# Patient Record
Sex: FEMALE | Race: WHITE | NOT HISPANIC OR LATINO | URBAN - METROPOLITAN AREA
[De-identification: names, ages, dates, MRNs, and addresses within clinical notes are randomized per-mention and may not be internally consistent; named-entity substitution may affect disease eponyms.]

---

## 2018-12-01 ENCOUNTER — INPATIENT (INPATIENT)
Facility: HOSPITAL | Age: 28
LOS: 3 days | Discharge: PSYCHIATRIC OR SUBSTANCE USE FACILITY | DRG: 389 | End: 2018-12-05
Attending: SURGERY | Admitting: SURGERY
Payer: COMMERCIAL

## 2018-12-01 VITALS
SYSTOLIC BLOOD PRESSURE: 121 MMHG | DIASTOLIC BLOOD PRESSURE: 64 MMHG | HEART RATE: 109 BPM | TEMPERATURE: 98 F | OXYGEN SATURATION: 99 % | RESPIRATION RATE: 18 BRPM

## 2018-12-01 LAB
ALBUMIN SERPL ELPH-MCNC: 2.6 G/DL — LOW (ref 3.4–5)
ALP SERPL-CCNC: 97 U/L — SIGNIFICANT CHANGE UP (ref 40–120)
ALT FLD-CCNC: SIGNIFICANT CHANGE UP U/L (ref 12–42)
ANION GAP SERPL CALC-SCNC: 10 MMOL/L — SIGNIFICANT CHANGE UP (ref 9–16)
APAP SERPL-MCNC: 3.4 UG/ML — LOW (ref 10–30)
APPEARANCE UR: CLEAR — SIGNIFICANT CHANGE UP
APTT BLD: 32.6 SEC — SIGNIFICANT CHANGE UP (ref 27.5–36.3)
AST SERPL-CCNC: 71 U/L — HIGH (ref 15–37)
BASOPHILS NFR BLD AUTO: 0.2 % — SIGNIFICANT CHANGE UP (ref 0–2)
BILIRUB SERPL-MCNC: SIGNIFICANT CHANGE UP MG/DL (ref 0.2–1.2)
BILIRUB UR-MCNC: NEGATIVE — SIGNIFICANT CHANGE UP
BUN SERPL-MCNC: 24 MG/DL — HIGH (ref 7–23)
CALCIUM SERPL-MCNC: 9.4 MG/DL — SIGNIFICANT CHANGE UP (ref 8.5–10.5)
CHLORIDE SERPL-SCNC: 105 MMOL/L — SIGNIFICANT CHANGE UP (ref 96–108)
CK SERPL-CCNC: 2494 U/L — HIGH (ref 26–192)
CO2 SERPL-SCNC: 18 MMOL/L — LOW (ref 22–31)
COLOR SPEC: YELLOW — SIGNIFICANT CHANGE UP
CREAT SERPL-MCNC: 1.03 MG/DL — SIGNIFICANT CHANGE UP (ref 0.5–1.3)
DIFF PNL FLD: ABNORMAL
EOSINOPHIL NFR BLD AUTO: 0.5 % — SIGNIFICANT CHANGE UP (ref 0–6)
GLUCOSE SERPL-MCNC: 109 MG/DL — HIGH (ref 70–99)
GLUCOSE UR QL: NEGATIVE — SIGNIFICANT CHANGE UP
HCG SERPL-ACNC: 1 MIU/ML — SIGNIFICANT CHANGE UP
HCT VFR BLD CALC: 37.3 % — SIGNIFICANT CHANGE UP (ref 34.5–45)
HGB BLD-MCNC: 12.6 G/DL — SIGNIFICANT CHANGE UP (ref 11.5–15.5)
IMM GRANULOCYTES NFR BLD AUTO: 0.5 % — SIGNIFICANT CHANGE UP (ref 0–1.5)
INR BLD: 1.18 — HIGH (ref 0.88–1.16)
KETONES UR-MCNC: NEGATIVE — SIGNIFICANT CHANGE UP
LACTATE SERPL-SCNC: 1.3 MMOL/L — SIGNIFICANT CHANGE UP (ref 0.4–2)
LEUKOCYTE ESTERASE UR-ACNC: NEGATIVE — SIGNIFICANT CHANGE UP
LYMPHOCYTES # BLD AUTO: 6.1 % — LOW (ref 13–44)
MAGNESIUM SERPL-MCNC: 2.5 MG/DL — SIGNIFICANT CHANGE UP (ref 1.6–2.6)
MCHC RBC-ENTMCNC: 26.8 PG — LOW (ref 27–34)
MCHC RBC-ENTMCNC: 33.8 G/DL — SIGNIFICANT CHANGE UP (ref 32–36)
MCV RBC AUTO: 79.4 FL — LOW (ref 80–100)
MONOCYTES NFR BLD AUTO: 6.4 % — SIGNIFICANT CHANGE UP (ref 2–14)
NEUTROPHILS NFR BLD AUTO: 86.3 % — HIGH (ref 43–77)
NITRITE UR-MCNC: NEGATIVE — SIGNIFICANT CHANGE UP
PCP SPEC-MCNC: SIGNIFICANT CHANGE UP
PH UR: 6 — SIGNIFICANT CHANGE UP (ref 5–8)
PLATELET # BLD AUTO: 321 K/UL — SIGNIFICANT CHANGE UP (ref 150–400)
POTASSIUM SERPL-MCNC: 4.4 MMOL/L — SIGNIFICANT CHANGE UP (ref 3.5–5.3)
POTASSIUM SERPL-SCNC: 4.4 MMOL/L — SIGNIFICANT CHANGE UP (ref 3.5–5.3)
PROT SERPL-MCNC: 7.9 G/DL — SIGNIFICANT CHANGE UP (ref 6.4–8.2)
PROT UR-MCNC: NEGATIVE MG/DL — SIGNIFICANT CHANGE UP
PROTHROM AB SERPL-ACNC: 13.1 SEC — HIGH (ref 10–12.9)
RBC # BLD: 4.7 M/UL — SIGNIFICANT CHANGE UP (ref 3.8–5.2)
RBC # FLD: 14.1 % — SIGNIFICANT CHANGE UP (ref 10.3–14.5)
SALICYLATES SERPL-MCNC: 2.3 MG/DL — LOW (ref 2.8–20)
SODIUM SERPL-SCNC: 133 MMOL/L — SIGNIFICANT CHANGE UP (ref 132–145)
SP GR SPEC: 1.01 — SIGNIFICANT CHANGE UP (ref 1–1.03)
UROBILINOGEN FLD QL: 0.2 E.U./DL — SIGNIFICANT CHANGE UP
WBC # BLD: 19.4 K/UL — HIGH (ref 3.8–10.5)
WBC # FLD AUTO: 19.4 K/UL — HIGH (ref 3.8–10.5)

## 2018-12-01 PROCEDURE — 72192 CT PELVIS W/O DYE: CPT | Mod: 26,59

## 2018-12-01 PROCEDURE — 72125 CT NECK SPINE W/O DYE: CPT | Mod: 26

## 2018-12-01 PROCEDURE — 71250 CT THORAX DX C-: CPT | Mod: 26

## 2018-12-01 PROCEDURE — 99292 CRITICAL CARE ADDL 30 MIN: CPT

## 2018-12-01 PROCEDURE — 99291 CRITICAL CARE FIRST HOUR: CPT

## 2018-12-01 PROCEDURE — 72131 CT LUMBAR SPINE W/O DYE: CPT | Mod: 26

## 2018-12-01 PROCEDURE — 71045 X-RAY EXAM CHEST 1 VIEW: CPT | Mod: 26

## 2018-12-01 PROCEDURE — 74176 CT ABD & PELVIS W/O CONTRAST: CPT | Mod: 26

## 2018-12-01 PROCEDURE — 93010 ELECTROCARDIOGRAM REPORT: CPT

## 2018-12-01 PROCEDURE — 70450 CT HEAD/BRAIN W/O DYE: CPT | Mod: 26

## 2018-12-01 RX ORDER — CEFTRIAXONE 500 MG/1
1 INJECTION, POWDER, FOR SOLUTION INTRAMUSCULAR; INTRAVENOUS ONCE
Qty: 0 | Refills: 0 | Status: COMPLETED | OUTPATIENT
Start: 2018-12-01 | End: 2018-12-01

## 2018-12-01 RX ORDER — ONDANSETRON 8 MG/1
4 TABLET, FILM COATED ORAL EVERY 6 HOURS
Qty: 0 | Refills: 0 | Status: DISCONTINUED | OUTPATIENT
Start: 2018-12-01 | End: 2018-12-01

## 2018-12-01 RX ORDER — MORPHINE SULFATE 50 MG/1
4 CAPSULE, EXTENDED RELEASE ORAL ONCE
Qty: 0 | Refills: 0 | Status: DISCONTINUED | OUTPATIENT
Start: 2018-12-01 | End: 2018-12-01

## 2018-12-01 RX ORDER — SODIUM CHLORIDE 9 MG/ML
2000 INJECTION, SOLUTION INTRAVENOUS
Qty: 0 | Refills: 0 | Status: COMPLETED | OUTPATIENT
Start: 2018-12-01 | End: 2018-12-01

## 2018-12-01 RX ORDER — SODIUM CHLORIDE 9 MG/ML
1000 INJECTION, SOLUTION INTRAVENOUS
Qty: 0 | Refills: 0 | Status: DISCONTINUED | OUTPATIENT
Start: 2018-12-01 | End: 2018-12-02

## 2018-12-01 RX ORDER — ACETAMINOPHEN 500 MG
1000 TABLET ORAL ONCE
Qty: 0 | Refills: 0 | Status: COMPLETED | OUTPATIENT
Start: 2018-12-01 | End: 2018-12-01

## 2018-12-01 RX ORDER — SODIUM CHLORIDE 9 MG/ML
1000 INJECTION, SOLUTION INTRAVENOUS
Qty: 0 | Refills: 0 | Status: COMPLETED | OUTPATIENT
Start: 2018-12-01 | End: 2018-12-01

## 2018-12-01 RX ORDER — SERTRALINE 25 MG/1
200 TABLET, FILM COATED ORAL ONCE
Qty: 0 | Refills: 0 | Status: DISCONTINUED | OUTPATIENT
Start: 2018-12-01 | End: 2018-12-01

## 2018-12-01 RX ORDER — LIDOCAINE 4 G/100G
10 CREAM TOPICAL ONCE
Qty: 0 | Refills: 0 | Status: COMPLETED | OUTPATIENT
Start: 2018-12-01 | End: 2018-12-01

## 2018-12-01 RX ORDER — ACETAMINOPHEN 500 MG
975 TABLET ORAL ONCE
Qty: 0 | Refills: 0 | Status: COMPLETED | OUTPATIENT
Start: 2018-12-01 | End: 2018-12-01

## 2018-12-01 RX ORDER — HEPARIN SODIUM 5000 [USP'U]/ML
7500 INJECTION INTRAVENOUS; SUBCUTANEOUS EVERY 8 HOURS
Qty: 0 | Refills: 0 | Status: DISCONTINUED | OUTPATIENT
Start: 2018-12-01 | End: 2018-12-05

## 2018-12-01 RX ORDER — BENZOCAINE AND MENTHOL 5; 1 G/100ML; G/100ML
1 LIQUID ORAL THREE TIMES A DAY
Qty: 0 | Refills: 0 | Status: DISCONTINUED | OUTPATIENT
Start: 2018-12-01 | End: 2018-12-05

## 2018-12-01 RX ORDER — SODIUM CHLORIDE 9 MG/ML
3000 INJECTION INTRAMUSCULAR; INTRAVENOUS; SUBCUTANEOUS ONCE
Qty: 0 | Refills: 0 | Status: COMPLETED | OUTPATIENT
Start: 2018-12-01 | End: 2018-12-01

## 2018-12-01 RX ADMIN — SODIUM CHLORIDE 3000 MILLILITER(S): 9 INJECTION INTRAMUSCULAR; INTRAVENOUS; SUBCUTANEOUS at 12:15

## 2018-12-01 RX ADMIN — Medication 975 MILLIGRAM(S): at 09:30

## 2018-12-01 RX ADMIN — MORPHINE SULFATE 4 MILLIGRAM(S): 50 CAPSULE, EXTENDED RELEASE ORAL at 20:42

## 2018-12-01 RX ADMIN — MORPHINE SULFATE 4 MILLIGRAM(S): 50 CAPSULE, EXTENDED RELEASE ORAL at 17:44

## 2018-12-01 RX ADMIN — SODIUM CHLORIDE 2000 MILLILITER(S): 9 INJECTION, SOLUTION INTRAVENOUS at 12:15

## 2018-12-01 RX ADMIN — SODIUM CHLORIDE 150 MILLILITER(S): 9 INJECTION, SOLUTION INTRAVENOUS at 23:18

## 2018-12-01 RX ADMIN — LIDOCAINE 10 MILLILITER(S): 4 CREAM TOPICAL at 16:35

## 2018-12-01 RX ADMIN — SODIUM CHLORIDE 1000 MILLILITER(S): 9 INJECTION, SOLUTION INTRAVENOUS at 17:44

## 2018-12-01 RX ADMIN — CEFTRIAXONE 100 GRAM(S): 500 INJECTION, POWDER, FOR SOLUTION INTRAMUSCULAR; INTRAVENOUS at 10:05

## 2018-12-01 RX ADMIN — Medication 975 MILLIGRAM(S): at 12:14

## 2018-12-01 RX ADMIN — MORPHINE SULFATE 4 MILLIGRAM(S): 50 CAPSULE, EXTENDED RELEASE ORAL at 12:15

## 2018-12-01 RX ADMIN — SODIUM CHLORIDE 1000 MILLILITER(S): 9 INJECTION, SOLUTION INTRAVENOUS at 10:53

## 2018-12-01 RX ADMIN — Medication 400 MILLIGRAM(S): at 23:18

## 2018-12-01 RX ADMIN — SODIUM CHLORIDE 3000 MILLILITER(S): 9 INJECTION INTRAMUSCULAR; INTRAVENOUS; SUBCUTANEOUS at 09:15

## 2018-12-01 RX ADMIN — MORPHINE SULFATE 4 MILLIGRAM(S): 50 CAPSULE, EXTENDED RELEASE ORAL at 10:46

## 2018-12-01 RX ADMIN — BENZOCAINE AND MENTHOL 1 LOZENGE: 5; 1 LIQUID ORAL at 23:57

## 2018-12-01 RX ADMIN — CEFTRIAXONE 1 GRAM(S): 500 INJECTION, POWDER, FOR SOLUTION INTRAMUSCULAR; INTRAVENOUS at 12:15

## 2018-12-01 RX ADMIN — HEPARIN SODIUM 7500 UNIT(S): 5000 INJECTION INTRAVENOUS; SUBCUTANEOUS at 23:18

## 2018-12-01 NOTE — ED PROVIDER NOTE - PROGRESS NOTE DETAILS
Spoke to Dr. Marquez who is covering her bariatric surgeon at [facility] Dr. SANTOS Palmer. She is s/p lap band ~ 6 years ago. He will call admitting at [facility] to address bed status etc. Speaking to [facility] re. Admit. TFR cent at Health system and Emilio Dailey agree that delays for a bed at Health system will be a problem. He spoke with family and recommends admission to [facility]. CTs so far no fx, labs wnl. Starting to feel better. CTAP shows SBP- prox small bowel. Other scans wnl/. UA neg. Tox +_ve for benzos and opioids (but had been given Morphine). Patient told father that she too 10 x Xanax 1mg with heavy EtOH in a Suicide attempt 2 days ago. No active SI curretnly. Speaking to [facility] re. Admit. TFR cent at [facility] and Emilio Dailey agree that delays for a bed at [facility] will be a problem. He spoke with family and recommends admission to [facility]. Dr. mart of [facility] is calling on call Bariatrics person. Nursing placing NGT. Patient and family agreeable. Speaking to [facility] re. Admit. CRISTÓBAL cent at [facility] and Emilio Dailey agree that delays for a bed at [facility] will be a problem. He spoke with family and recommends admission to [facility]. Dr. Covington of [facility] is calling on call Bariatrics person. Nursing placing NGT. Patient and family agreeable. Case will be admitted to Dr. Covington with Dr. Low of bariatrics involved prn. CTAP shows SBO-  small bowel/prox ileum with possible trasnition point at an adhesion. Other scans wnl/. UA neg. Tox +_ve for benzos and opioids (but had been given Morphine). Patient told father that she too 10 x Xanax 1mg with heavy EtOH in a Suicide attempt 2 days ago. No active SI curretnly.

## 2018-12-01 NOTE — ED PROVIDER NOTE - DIAGNOSTIC INTERPRETATION
Interpreted by ED Physician:  CXR (1 view): no acute abnormality: no infiltrates, bones appear intact, cardiac silhouette wnl, NGT position looks good.

## 2018-12-01 NOTE — ED ADULT NURSE REASSESSMENT NOTE - DESCRIPTION
father at beside states that patient informed him of SI. Pt admits to excessive alcohol use accompanied with taking xanax [id number] in hopes of ending her life. Pt currently calm, compliant nad. denies auditory/visual hallucinations and HI. provided made aware.

## 2018-12-01 NOTE — ED ADULT NURSE REASSESSMENT NOTE - NS ED NURSE REASSESS COMMENT FT1
pt requested catheter to be removed. catheter removed as per pt. pt requested wong catheter to be removed. catheter removed as per pt.

## 2018-12-01 NOTE — H&P ADULT - ASSESSMENT
28F s/p failed suicide attempt found to have SBO on CT    -NPO/IVF  -NGT to LIWS  -Psych consult  -DVT PPx  -Pain control  -AM labs 28F s/p failed suicide attempt found to have SBO on CT    -NPO/IVF  -NGT to LIWS  -Psych consult  -DVT PPx  -Pain control  -AM labs    *** Chief Resident Note ***  Patient seen and examined at bedside upon arrival to [facility] late evening 12/1/2018.    28F with h/o morbid obesity (wt 142.9 kg currently), h/o lap band placed at OSH, PMH ADHD, depression with anxiety was BIBEMS found down in her apt (EMS busted her door open). She binge drank and took 10 Xanax (Suicidal attempt) and collapsed; was on her back for 2 days. She was down immediately brought to [facility]; VS: 97.8, 109, 121/64, 18, 99%. [facility] PE: Abdomen soft, nontender, no guarding. WBC 19.4, dehydrated and CK 2494; UTox +Benzo and +opioid. CT showed SBO w/TP in proximal ileum. CT spine: Degen disc disease. Upon arrival to [facility], patient was afebrile, , rest of VSS. On Exam, NGT put out light bilious scant amount. No complaints other than NGT discomfort. Not passing gas. No fevers, abdominal pain. Abdomen soft, nondistended, very mild TTP, port site palpable, no erythema. Plan to c/w 1:1 monitoring for active suicidal ideation and failed attempt, psych consult, c/w NGT to LIWS, NPO/IVF, serial abdominal exams; Viral panel for fever reported downtown at [facility], continue VS monitoring, fever curve and strict I/O's and UO Monitoring, trend CK.    Patient discussed in full detail with Attending Dr. Covington who agrees with the aforementioned assessment and plan.

## 2018-12-01 NOTE — ED PROVIDER NOTE - CARE PLAN
Principal Discharge DX:	Small bowel obstruction due to adhesions  Secondary Diagnosis:	Suicidal overdose, initial encounter  Secondary Diagnosis:	Dehydration

## 2018-12-01 NOTE — ED PROVIDER NOTE - OBJECTIVE STATEMENT
27 yo F w/ PMHx of depression, anxiety, ADHD BIBEMS c/o b/l hip pain radiating to buttocks and suprapubic pain s/p fall 2 days ago. Pt states she'd been drinking 2 days ago in her room when she fell and could not get up due to the hip pain. Pt was unable to get off the floor for 2 days. EMS/[employer] alerted after bystander heard her calling out for help. Pt reports episode of incontinence while lying on the floor. On arrival, pt is febrile. Denies upper back/neck/chest/LE/UE pain, headache, N/V, LOC, numbness/tingling, confusion, 27 yo F w/ PMHx of depression, anxiety, ADHD BIBEMS c/o b/l hip pain radiating to buttocks and suprapubic pain s/p fall 2 days ago. Pt states she'd been drinking 2 days ago in her room when she fell and could not get up due to the hip pain. Pt was unable to get off the floor for 2 days. EMS/[employer] alerted after bystander heard her calling out for help. Pt reports episode of incontinence while lying on the floor- she had been laying on her back for 2 days. On arrival, pt is febrile. Denies upper back/neck/chest/LE/UE pain, headache, N/V, LOC, numbness/tingling, confusion. Denied suicidal intent.

## 2018-12-01 NOTE — ED ADULT NURSE NOTE - OBJECTIVE STATEMENT
pt upon arrival states that she only takes hypertension medication & zoloft for depression. Pt admits having general daily depression but denies SI&HI upon arrival. EMS stated they found an empty bottle of percocet at pt bedside that was filled last month and was empty. Pt admits to drinking more then usual and denies drug use.    Father arrives at 2pm stating hx of lap band surgery 5 years ago at [facility] that patient had difficulty with diet compliance. Pt has hx of anxiety and depression prescribed xanax. At around 1430 father expressed to another nurse and MD that pt stated to him she took "10 of her xanax pills and drank in an attempt to kill herself ."

## 2018-12-01 NOTE — H&P ADULT - NSHPLABSRESULTS_GEN_ALL_CORE
12.6   19.4  )-----------( 321      ( 01 Dec 2018 09:33 )             37.3     12-01    133  |  105  |  24<H>  ----------------------------<  109<H>  4.4   |  18<L>  |  1.03    Ca    9.4      01 Dec 2018 09:33  Mg     2.5     12-01    TPro  7.9  /  Alb  2.6<L>  /  TBili  Footnote  /  DBili  x   /  AST  71<H>  /  ALT  Footnote  /  AlkPhos  97  12-01    PT/INR - ( 01 Dec 2018 10:15 )   PT: 13.1 sec;   INR: 1.18          PTT - ( 01 Dec 2018 10:15 )  PTT:32.6 sec

## 2018-12-01 NOTE — ED ADULT TRIAGE NOTE - CHIEF COMPLAINT QUOTE
pt states she drank 2 days ago and collapsed to the ground and then could not get back up. States she has been yelling for help last 2 days and a neighbor called 911. Pt states she has been laying on the floor that entire tire. hx of obesity, htn, depression.

## 2018-12-01 NOTE — H&P ADULT - HISTORY OF PRESENT ILLNESS
28F was found down by EMS in her apartment after drinking alcohol and taking approximately 10 xanax's in a suicide attempt. Patient was down for 2 days with intermittent consciousness. Patient complained of bilateral hip, buttock, and suprapublic pain. At [facility], patient found to be febrile to 102F. Patient underwent CT scan which revealed SBO at proximal ileum likely 2/2 adhesions. Review of systems positive for fevers and chills. Denies n/v/CP/SOB/abd pain. Patient denies currently having suicidal ideations. No flatus/BM for 4 days.     PMH: Anxiety, Depression, PTSD, ADHD  PSH: Lap band (2015 @ [facility])  Meds: Zoloft, Xanax Doxazosin  Allergies: PCN (rash), Amoxacillin (rash), Shellfish (Dyspnea/Swelling of throat)  SH: Distant social smoker, social drinker, occasional marijuana use  FH: Waldenstrom's macroglobinemia (mother) 28F PSHx lap band was found down by EMS in her apartment after drinking alcohol and taking approximately 10 xanax's in a suicide attempt. Patient was down for 2 days with intermittent consciousness. Patient complained of bilateral hip, buttock, and suprapublic pain. At [facility], patient found to be febrile to 102F. Patient underwent CT scan which revealed SBO at proximal ileum likely 2/2 adhesions. Review of systems positive for fevers and chills. Denies n/v/CP/SOB/abd pain. Patient denies currently having suicidal ideations. No flatus/BM for 4 days.     PMH: Anxiety, Depression, PTSD, ADHD  PSH: Lap band (2015 @ [facility])  Meds: Zoloft, Xanax Doxazosin  Allergies: PCN (rash), Amoxacillin (rash), Shellfish (Dyspnea/Swelling of throat)  SH: Distant social smoker, social drinker, occasional marijuana use  FH: Waldenstrom's macroglobinemia (mother)

## 2018-12-01 NOTE — ED PROVIDER NOTE - CHPI ED SYMPTOMS NEG
no vomiting/no deformity/no tingling/no numbness/no bleeding/no confusion/no nausea/no loss of consciousness

## 2018-12-01 NOTE — ED PROVIDER NOTE - CRITICAL CARE PROVIDED
consultation with other physicians/consult w/ pt's family directly relating to pts condition/documentation/interpretation of diagnostic studies/direct patient care (not related to procedure)

## 2018-12-01 NOTE — ED PROVIDER NOTE - MEDICAL DECISION MAKING DETAILS
Patient presenting with low back and b/l hip pain and severe dehydration following EtOH binge and falling down in her apt- laid on the ground x 2d. Will do comprehensive mart. Also febrile. Sepsis protocol initiated. Due to serve obesity IVF dialed back initially to be closer to ideal weight.

## 2018-12-01 NOTE — ED ADULT NURSE NOTE - NSIMPLEMENTINTERV_GEN_ALL_ED
Implemented All Fall with Harm Risk Interventions:  [place] to call system. Call bell, personal items and telephone within reach. Instruct patient to call for assistance. Room bathroom lighting operational. Non-slip footwear when patient is off stretcher. Physically safe environment: no spills, clutter or unnecessary equipment. Stretcher in lowest position, wheels locked, appropriate side rails in place. Provide visual cue, wrist band, yellow gown, etc. Monitor gait and stability. Monitor for mental status changes and reorient to person, place, and time. Review medications for side effects contributing to fall risk. Reinforce activity limits and safety measures with patient and family. Provide visual clues: red socks.

## 2018-12-02 LAB
ANION GAP SERPL CALC-SCNC: 7 MMOL/L — SIGNIFICANT CHANGE UP (ref 5–17)
BUN SERPL-MCNC: 19 MG/DL — SIGNIFICANT CHANGE UP (ref 7–23)
CALCIUM SERPL-MCNC: 8.6 MG/DL — SIGNIFICANT CHANGE UP (ref 8.4–10.5)
CHLORIDE SERPL-SCNC: 107 MMOL/L — SIGNIFICANT CHANGE UP (ref 96–108)
CK SERPL-CCNC: 1138 U/L — HIGH (ref 25–170)
CO2 SERPL-SCNC: 22 MMOL/L — SIGNIFICANT CHANGE UP (ref 22–31)
CREAT SERPL-MCNC: 0.65 MG/DL — SIGNIFICANT CHANGE UP (ref 0.5–1.3)
EXTRA SST TUBE: SIGNIFICANT CHANGE UP
GLUCOSE SERPL-MCNC: 95 MG/DL — SIGNIFICANT CHANGE UP (ref 70–99)
HCT VFR BLD CALC: 34.5 % — SIGNIFICANT CHANGE UP (ref 34.5–45)
HGB BLD-MCNC: 11.2 G/DL — LOW (ref 11.5–15.5)
MAGNESIUM SERPL-MCNC: 2.4 MG/DL — SIGNIFICANT CHANGE UP (ref 1.6–2.6)
MCHC RBC-ENTMCNC: 26.6 PG — LOW (ref 27–34)
MCHC RBC-ENTMCNC: 32.5 G/DL — SIGNIFICANT CHANGE UP (ref 32–36)
MCV RBC AUTO: 81.9 FL — SIGNIFICANT CHANGE UP (ref 80–100)
PHOSPHATE SERPL-MCNC: 2.2 MG/DL — LOW (ref 2.5–4.5)
PLATELET # BLD AUTO: 259 K/UL — SIGNIFICANT CHANGE UP (ref 150–400)
POTASSIUM SERPL-MCNC: 3.9 MMOL/L — SIGNIFICANT CHANGE UP (ref 3.5–5.3)
POTASSIUM SERPL-SCNC: 3.9 MMOL/L — SIGNIFICANT CHANGE UP (ref 3.5–5.3)
RAPID RVP RESULT: SIGNIFICANT CHANGE UP
RBC # BLD: 4.21 M/UL — SIGNIFICANT CHANGE UP (ref 3.8–5.2)
RBC # FLD: 14.4 % — SIGNIFICANT CHANGE UP (ref 10.3–16.9)
SODIUM SERPL-SCNC: 136 MMOL/L — SIGNIFICANT CHANGE UP (ref 135–145)
WBC # BLD: 10.1 K/UL — SIGNIFICANT CHANGE UP (ref 3.8–10.5)
WBC # FLD AUTO: 10.1 K/UL — SIGNIFICANT CHANGE UP (ref 3.8–10.5)

## 2018-12-02 RX ORDER — DIPHENHYDRAMINE HCL 50 MG
50 CAPSULE ORAL ONCE
Qty: 0 | Refills: 0 | Status: COMPLETED | OUTPATIENT
Start: 2018-12-02 | End: 2018-12-02

## 2018-12-02 RX ORDER — POTASSIUM PHOSPHATE, MONOBASIC POTASSIUM PHOSPHATE, DIBASIC 236; 224 MG/ML; MG/ML
15 INJECTION, SOLUTION INTRAVENOUS ONCE
Qty: 0 | Refills: 0 | Status: COMPLETED | OUTPATIENT
Start: 2018-12-02 | End: 2018-12-02

## 2018-12-02 RX ORDER — ACETAMINOPHEN 500 MG
650 TABLET ORAL ONCE
Qty: 0 | Refills: 0 | Status: COMPLETED | OUTPATIENT
Start: 2018-12-02 | End: 2018-12-02

## 2018-12-02 RX ORDER — SODIUM,POTASSIUM PHOSPHATES 278-250MG
1 POWDER IN PACKET (EA) ORAL ONCE
Qty: 0 | Refills: 0 | Status: COMPLETED | OUTPATIENT
Start: 2018-12-02 | End: 2018-12-02

## 2018-12-02 RX ORDER — ACETAMINOPHEN 500 MG
1000 TABLET ORAL ONCE
Qty: 0 | Refills: 0 | Status: COMPLETED | OUTPATIENT
Start: 2018-12-02 | End: 2018-12-02

## 2018-12-02 RX ADMIN — SODIUM CHLORIDE 150 MILLILITER(S): 9 INJECTION, SOLUTION INTRAVENOUS at 11:25

## 2018-12-02 RX ADMIN — HEPARIN SODIUM 7500 UNIT(S): 5000 INJECTION INTRAVENOUS; SUBCUTANEOUS at 21:19

## 2018-12-02 RX ADMIN — Medication 1 PACKET(S): at 19:11

## 2018-12-02 RX ADMIN — HEPARIN SODIUM 7500 UNIT(S): 5000 INJECTION INTRAVENOUS; SUBCUTANEOUS at 14:21

## 2018-12-02 RX ADMIN — Medication 50 MILLIGRAM(S): at 03:08

## 2018-12-02 RX ADMIN — Medication 1000 MILLIGRAM(S): at 11:34

## 2018-12-02 RX ADMIN — Medication 400 MILLIGRAM(S): at 11:19

## 2018-12-02 RX ADMIN — BENZOCAINE AND MENTHOL 1 LOZENGE: 5; 1 LIQUID ORAL at 03:08

## 2018-12-02 RX ADMIN — Medication 1000 MILLIGRAM(S): at 00:10

## 2018-12-02 RX ADMIN — Medication 650 MILLIGRAM(S): at 19:46

## 2018-12-02 RX ADMIN — HEPARIN SODIUM 7500 UNIT(S): 5000 INJECTION INTRAVENOUS; SUBCUTANEOUS at 06:35

## 2018-12-02 RX ADMIN — POTASSIUM PHOSPHATE, MONOBASIC POTASSIUM PHOSPHATE, DIBASIC 62.5 MILLIMOLE(S): 236; 224 INJECTION, SOLUTION INTRAVENOUS at 11:20

## 2018-12-02 RX ADMIN — Medication 650 MILLIGRAM(S): at 18:46

## 2018-12-02 NOTE — PROGRESS NOTE ADULT - ASSESSMENT
28F s/p failed suicide attempt found to have dilated small bowel on CT, having bowel function, unlikely SBO.    pain/nausea control  IS/OOB  NPO, IVF  [place]  SCDs, HSQ  consults: psych 28F s/p failed suicide attempt found to have dilated small bowel on CT, having bowel function, unlikely SBO.    pain/nausea control  IS/OOB  NPO, IVF  SCDs, HSQ  consults: psych
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
## 2018-12-02 NOTE — BEHAVIORAL HEALTH ASSESSMENT NOTE - NSBHREFERDETAILS_PSY_A_CORE_FT
Evaluate for ongoing SI and potential transfer to inpatient psychiatry in the setting of medication overdose

## 2018-12-02 NOTE — BEHAVIORAL HEALTH ASSESSMENT NOTE - DESCRIPTION (FIRST USE, LAST USE, QUANTITY, FREQUENCY, DURATION)
Uses .5-1 drink per month Uses once every few months tried once many years ago Prescribed xanax  by MD

## 2018-12-02 NOTE — BEHAVIORAL HEALTH ASSESSMENT NOTE - OTHER PAST PSYCHIATRIC HISTORY (INCLUDE DETAILS REGARDING ONSET, COURSE OF ILLNESS, INPATIENT/OUTPATIENT TREATMENT)
3 previous hospitalizations in 2008- one in [place], [facility], and [facility]. All vooluntary  Denied violence aggression, substance abuse

## 2018-12-02 NOTE — BEHAVIORAL HEALTH ASSESSMENT NOTE - NSBHSUICPROTECTFACT_PSY_A_CORE
Supportive social network or family/Ability to cope with stress/Responsibility to family and others/Positive therapeutic relationships

## 2018-12-02 NOTE — BEHAVIORAL HEALTH ASSESSMENT NOTE - HPI (INCLUDE ILLNESS QUALITY, SEVERITY, DURATION, TIMING, CONTEXT, MODIFYING FACTORS, ASSOCIATED SIGNS AND SYMPTOMS)
Istop revealed no results:  Confidential Drug Utilization Report  Search Terms: Caitlin haile, 1990   Search Date: 12/02/2018 05:07:19 PM  This report was requested by: Jessica Velasquez | Reference #: [id number]   There are no results for the search terms that you entered. Ms. Aguilar is a 28 year old female, , domiciled alone, [employer] student, single without children, with a medical history of obesity and a psychiatric history of Depression, Anxiety, ADHD, and PTSD, 3 remote psych hosps in 2008 for SI, 2 remote suicide attempts via overdose, cutting, without violence/aggression, substance use, who was BIBEMS on 12/1/18 found down x 2 days after the patient intentionally overdosed on alcohol and Xanax. The patient said she had researched how to administer and alcohol enema and attempted to insert one liter of alcohol into her rectum. She also planned to take 60 tablets of 1 mg Xanax but stated 'I messed up the order" and she passed out before she could finish the pills. She reported she was unable to get up and laid on the floor for two days, yelling for help. She had been planning this attempt for weeks after she "gave it a good ten years." She reported she tried to be happy and dhruv rid of her depression for 10 years, since her last attempt, and told herself she would try again if she did not make progress. She reported she has suicidal ideation on more days than she does not. She does not feel safe returning home due to ongoing active si 10/10 without plan or intent. She feels disappointed and guilty. She was agreeable to being voluntarily admitted. Denied Si, HI, AVH.    Her mother and father were present at the bedside and interested in being involved in her care. The patient was agreeable to them receiving "broad strokes",  but preferred to leave out specific details.      Istop revealed no results:  Confidential Drug Utilization Report  Search Terms: Caitlin aguilar, 1990   Search Date: 12/02/2018 05:07:19 PM  This report was requested by: Jessica Velasquez | Reference #: [id number]   There are no results for the search terms that you entered.

## 2018-12-02 NOTE — BEHAVIORAL HEALTH ASSESSMENT NOTE - NSBHMEDSOTHERFT_PSY_A_CORE
Zoloft 200 mg po daily, Doxazosin 2 mg po bedtime for nightmares, Daytrana, 15 mg or 30 mg transdermal patch

## 2018-12-02 NOTE — PROGRESS NOTE ADULT - SUBJECTIVE AND OBJECTIVE BOX
SUBJECTIVE: This morning, she feels well; her pain is well-controlled. No nausea or vomiting. Passing flatus and having BMs. No acute complaints. No suprapubic pain.    heparin  Injectable 7500 Unit(s) SubCutaneous every 8 hours      Vital Signs Last 24 Hrs  T(C): 37 (02 Dec 2018 05:51), Max: 38 (01 Dec 2018 10:39)  T(F): 98.6 (02 Dec 2018 05:51), Max: 100.4 (01 Dec 2018 10:39)  HR: 94 (02 Dec 2018 05:51) (92 - 113)  BP: 123/58 (02 Dec 2018 05:51) (105/63 - 132/68)  BP(mean): --  RR: 17 (02 Dec 2018 05:51) (16 - 18)  SpO2: 100% (02 Dec 2018 05:51) (96% - 100%)  I&O's Detail    01 Dec 2018 07:01  -  02 Dec 2018 07:00  --------------------------------------------------------  IN:    lactated ringers.: 1350 mL  Total IN: 1350 mL    OUT:    Nasoenteral Tube: 200 mL    Voided: 1600 mL  Total OUT: 1800 mL    Total NET: -450 mL      02 Dec 2018 07:01  -  02 Dec 2018 09:24  --------------------------------------------------------  IN:    lactated ringers.: 150 mL  Total IN: 150 mL    OUT:  Total OUT: 0 mL    Total NET: 150 mL          General: NAD, resting comfortably in bed  Pulm: Nonlabored breathing, no respiratory distress  Abd: obese, soft, nontender, nondistended  Extrem: WWP, no edema        LABS:                        11.2   10.1  )-----------( 259      ( 02 Dec 2018 08:02 )             34.5     12-02    136  |  107  |  19  ----------------------------<  95  3.9   |  22  |  0.65    Ca    8.6      02 Dec 2018 08:02  Phos  2.2     12-02  Mg     2.4     12-    TPro  7.9  /  Alb  2.6<L>  /  TBili  Footnote  /  DBili  x   /  AST  71<H>  /  ALT  Footnote  /  AlkPhos  97  12-    PT/INR - ( 01 Dec 2018 10:15 )   PT: 13.1 sec;   INR: 1.18          PTT - ( 01 Dec 2018 10:15 )  PTT:32.6 sec  Urinalysis Basic - ( 01 Dec 2018 12:12 )    Color: Yellow / Appearance: Clear / S.010 / pH: x  Gluc: x / Ketone: NEGATIVE  / Bili: NEGATIVE / Urobili: 0.2 E.U./dL   Blood: x / Protein: NEGATIVE mg/dL / Nitrite: NEGATIVE   Leuk Esterase: NEGATIVE / RBC: < 5 /HPF / WBC < 5 /HPF   Sq Epi: x / Non Sq Epi: 0-5 /HPF / Bacteria: x        RADIOLOGY & ADDITIONAL STUDIES:

## 2018-12-02 NOTE — BEHAVIORAL HEALTH ASSESSMENT NOTE - NSBHVIOLPROTECT_PSY_A_CORE
Engagement in treatment/Insight into violence risk and need for management/treatment/Residential stability/Sobriety/Employment stability

## 2018-12-02 NOTE — BEHAVIORAL HEALTH ASSESSMENT NOTE - SUMMARY
Ms. Aguilar is a 28 year old female, , domiciled alone, [employer], single without children, with a medical history of obesity and a psychiatric history of Depression, Anxiety, ADHD, and PTSD, 3 remote psych hosps in 2008 for SI, 2 remote suicide attempts via overdose, cutting, without violence/aggression, substance use, who was BIBEMS on 12/1/18 found down x 2 days after the patient intentionally overdosed on alcohol and Xanax. Ms. Aguilar is a 28 year old female, , domiciled alone, [employer] student, single without children, with a medical history of obesity and a psychiatric history of Depression, Anxiety, ADHD, and PTSD, 3 remote psych hosps in 2008 for SI, 2 remote suicide attempts via overdose, cutting, without violence/aggression, substance use, who was BIBEMS on 12/1/18 found down x 2 days after the patient intentionally overdosed on alcohol and Xanax.    The patient is with ongoing Active SI without plan or intent, though does not feel safe returning home and is agreeing to a voluntary hospitalization for safety and stabilization.

## 2018-12-02 NOTE — BEHAVIORAL HEALTH ASSESSMENT NOTE - NSBHCONSULTMEDS_PSY_A_CORE FT
-Would initiate Sertraline 100 mg po daily, first dose 12/3, to avoid withdrawal  -Hold Doxazosin at this time  -Hold Daytrana at this time

## 2018-12-03 DIAGNOSIS — F33.2 MAJOR DEPRESSIVE DISORDER, RECURRENT SEVERE WITHOUT PSYCHOTIC FEATURES: ICD-10-CM

## 2018-12-03 DIAGNOSIS — F60.9 PERSONALITY DISORDER, UNSPECIFIED: ICD-10-CM

## 2018-12-03 LAB
ANION GAP SERPL CALC-SCNC: 15 MMOL/L — SIGNIFICANT CHANGE UP (ref 5–17)
APPEARANCE UR: CLEAR — SIGNIFICANT CHANGE UP
BILIRUB UR-MCNC: NEGATIVE — SIGNIFICANT CHANGE UP
BUN SERPL-MCNC: 12 MG/DL — SIGNIFICANT CHANGE UP (ref 7–23)
CALCIUM SERPL-MCNC: 8.5 MG/DL — SIGNIFICANT CHANGE UP (ref 8.4–10.5)
CHLORIDE SERPL-SCNC: 102 MMOL/L — SIGNIFICANT CHANGE UP (ref 96–108)
CO2 SERPL-SCNC: 21 MMOL/L — LOW (ref 22–31)
COLOR SPEC: YELLOW — SIGNIFICANT CHANGE UP
CREAT SERPL-MCNC: 0.61 MG/DL — SIGNIFICANT CHANGE UP (ref 0.5–1.3)
DIFF PNL FLD: ABNORMAL
GLUCOSE SERPL-MCNC: 97 MG/DL — SIGNIFICANT CHANGE UP (ref 70–99)
GLUCOSE UR QL: NEGATIVE — SIGNIFICANT CHANGE UP
HCT VFR BLD CALC: 35.5 % — SIGNIFICANT CHANGE UP (ref 34.5–45)
HGB BLD-MCNC: 11.4 G/DL — LOW (ref 11.5–15.5)
KETONES UR-MCNC: ABNORMAL MG/DL
LEUKOCYTE ESTERASE UR-ACNC: NEGATIVE — SIGNIFICANT CHANGE UP
MAGNESIUM SERPL-MCNC: 2.2 MG/DL — SIGNIFICANT CHANGE UP (ref 1.6–2.6)
MCHC RBC-ENTMCNC: 26.5 PG — LOW (ref 27–34)
MCHC RBC-ENTMCNC: 32.1 G/DL — SIGNIFICANT CHANGE UP (ref 32–36)
MCV RBC AUTO: 82.6 FL — SIGNIFICANT CHANGE UP (ref 80–100)
NITRITE UR-MCNC: NEGATIVE — SIGNIFICANT CHANGE UP
PH UR: 6 — SIGNIFICANT CHANGE UP (ref 5–8)
PHOSPHATE SERPL-MCNC: 3.8 MG/DL — SIGNIFICANT CHANGE UP (ref 2.5–4.5)
PLATELET # BLD AUTO: 261 K/UL — SIGNIFICANT CHANGE UP (ref 150–400)
POTASSIUM SERPL-MCNC: 3.7 MMOL/L — SIGNIFICANT CHANGE UP (ref 3.5–5.3)
POTASSIUM SERPL-SCNC: 3.7 MMOL/L — SIGNIFICANT CHANGE UP (ref 3.5–5.3)
PROT UR-MCNC: NEGATIVE MG/DL — SIGNIFICANT CHANGE UP
RBC # BLD: 4.3 M/UL — SIGNIFICANT CHANGE UP (ref 3.8–5.2)
RBC # FLD: 14.4 % — SIGNIFICANT CHANGE UP (ref 10.3–16.9)
SODIUM SERPL-SCNC: 138 MMOL/L — SIGNIFICANT CHANGE UP (ref 135–145)
SP GR SPEC: 1.01 — SIGNIFICANT CHANGE UP (ref 1–1.03)
UROBILINOGEN FLD QL: 0.2 E.U./DL — SIGNIFICANT CHANGE UP
WBC # BLD: 9.2 K/UL — SIGNIFICANT CHANGE UP (ref 3.8–10.5)
WBC # FLD AUTO: 9.2 K/UL — SIGNIFICANT CHANGE UP (ref 3.8–10.5)

## 2018-12-03 PROCEDURE — 99233 SBSQ HOSP IP/OBS HIGH 50: CPT

## 2018-12-03 RX ORDER — SERTRALINE 25 MG/1
50 TABLET, FILM COATED ORAL DAILY
Qty: 0 | Refills: 0 | Status: DISCONTINUED | OUTPATIENT
Start: 2018-12-03 | End: 2018-12-03

## 2018-12-03 RX ORDER — ACETAMINOPHEN 500 MG
1000 TABLET ORAL ONCE
Qty: 0 | Refills: 0 | Status: COMPLETED | OUTPATIENT
Start: 2018-12-03 | End: 2018-12-03

## 2018-12-03 RX ORDER — ACETAMINOPHEN 500 MG
650 TABLET ORAL ONCE
Qty: 0 | Refills: 0 | Status: COMPLETED | OUTPATIENT
Start: 2018-12-03 | End: 2018-12-03

## 2018-12-03 RX ORDER — LIDOCAINE 4 G/100G
1 CREAM TOPICAL
Qty: 0 | Refills: 0 | Status: DISCONTINUED | OUTPATIENT
Start: 2018-12-03 | End: 2018-12-04

## 2018-12-03 RX ORDER — DOCUSATE SODIUM 100 MG
100 CAPSULE ORAL
Qty: 0 | Refills: 0 | Status: DISCONTINUED | OUTPATIENT
Start: 2018-12-03 | End: 2018-12-03

## 2018-12-03 RX ORDER — ACETAMINOPHEN 500 MG
650 TABLET ORAL EVERY 6 HOURS
Qty: 0 | Refills: 0 | Status: DISCONTINUED | OUTPATIENT
Start: 2018-12-03 | End: 2018-12-05

## 2018-12-03 RX ORDER — SENNA PLUS 8.6 MG/1
2 TABLET ORAL AT BEDTIME
Qty: 0 | Refills: 0 | Status: DISCONTINUED | OUTPATIENT
Start: 2018-12-03 | End: 2018-12-03

## 2018-12-03 RX ORDER — PHENAZOPYRIDINE HCL 100 MG
100 TABLET ORAL EVERY 8 HOURS
Qty: 0 | Refills: 0 | Status: DISCONTINUED | OUTPATIENT
Start: 2018-12-03 | End: 2018-12-04

## 2018-12-03 RX ADMIN — LIDOCAINE 1 APPLICATION(S): 4 CREAM TOPICAL at 15:52

## 2018-12-03 RX ADMIN — Medication 650 MILLIGRAM(S): at 04:10

## 2018-12-03 RX ADMIN — LIDOCAINE 1 APPLICATION(S): 4 CREAM TOPICAL at 19:30

## 2018-12-03 RX ADMIN — Medication 650 MILLIGRAM(S): at 18:40

## 2018-12-03 RX ADMIN — Medication 650 MILLIGRAM(S): at 11:35

## 2018-12-03 RX ADMIN — HEPARIN SODIUM 7500 UNIT(S): 5000 INJECTION INTRAVENOUS; SUBCUTANEOUS at 06:26

## 2018-12-03 RX ADMIN — Medication 650 MILLIGRAM(S): at 17:37

## 2018-12-03 RX ADMIN — LIDOCAINE 1 APPLICATION(S): 4 CREAM TOPICAL at 12:05

## 2018-12-03 RX ADMIN — Medication 650 MILLIGRAM(S): at 12:32

## 2018-12-03 RX ADMIN — LIDOCAINE 1 APPLICATION(S): 4 CREAM TOPICAL at 22:37

## 2018-12-03 RX ADMIN — Medication 0.5 MILLIGRAM(S): at 19:30

## 2018-12-03 RX ADMIN — Medication 1000 MILLIGRAM(S): at 23:40

## 2018-12-03 RX ADMIN — Medication 650 MILLIGRAM(S): at 03:18

## 2018-12-03 RX ADMIN — Medication 1 MILLIGRAM(S): at 10:04

## 2018-12-03 RX ADMIN — HEPARIN SODIUM 7500 UNIT(S): 5000 INJECTION INTRAVENOUS; SUBCUTANEOUS at 15:45

## 2018-12-03 RX ADMIN — Medication 100 MILLIGRAM(S): at 22:37

## 2018-12-03 RX ADMIN — HEPARIN SODIUM 7500 UNIT(S): 5000 INJECTION INTRAVENOUS; SUBCUTANEOUS at 22:37

## 2018-12-03 NOTE — CONSULT NOTE ADULT - SUBJECTIVE AND OBJECTIVE BOX
HPI:  28F PSHx lap band was found down by EMS in her apartment after drinking alcohol and taking approximately 10 xanax's in a suicide attempt. Patient was down for 2 days with intermittent consciousness. Patient complained of bilateral hip, buttock, and suprapublic pain. At [facility], patient found to be febrile to 102F. Patient underwent CT scan which revealed SBO at proximal ileum likely 2/2 adhesions. Review of systems positive for fevers and chills. Denies n/v/CP/SOB/abd pain. Patient denies currently having suicidal ideations. No flatus/BM for 4 days.     : Patient reports pain with any movement of the catheter. She reports similar symptoms in 2015 with hx of multiple UTIs and hx of one episode of urinary retention which has resolved since. Catheter was last placed around 2am. Patient has only been OOB to use the restroom. She is having loose bowels and is not constipated. Patient at home reports no dysuria, hematuria or feeling of urinary retention- she is able to empty her bladder on a day to day basis. Denies hx kidney stones. She reports seeing a urologist after the lap band surgery in 2015- everything was reported to be normal. Of note, catheter pulled tight when examined at bedside, catheter was repositioned and secured in correct position.      PMH: Anxiety, Depression, PTSD, ADHD  PSH: Lap band (2015 @ [facility])  Meds: Zoloft, Xanax Doxazosin  Allergies: PCN (rash), Amoxacillin (rash), Shellfish (Dyspnea/Swelling of throat)  SH: Distant social smoker, social drinker, occasional marijuana use  FH: Waldenstrom's macroglobinemia (mother) (01 Dec 2018 23:04)      Vital Signs Last 24 Hrs  T(C): 36.8 (03 Dec 2018 09:08), Max: 36.8 (02 Dec 2018 13:05)  T(F): 98.2 (03 Dec 2018 09:08), Max: 98.3 (02 Dec 2018 13:05)  HR: 76 (03 Dec 2018 09:08) (62 - 86)  BP: 132/70 (03 Dec 2018 09:08) (114/68 - 133/63)  BP(mean): --  RR: 16 (03 Dec 2018 09:08) (16 - 18)  SpO2: 95% (03 Dec 2018 09:08) (95% - 98%)  I&O's Summary    02 Dec 2018 07:01  -  03 Dec 2018 07:00  --------------------------------------------------------  IN: 2950 mL / OUT: 2300 mL / NET: 650 mL        PE:   Gen: Obese, sitting laying in bed, NAD   Abd: Soft, non-distended, no ttp   : no suprapubic ttp, wong in place draining clear   CARLOS: Not indicated     LABS:                        11.4   9.2   )-----------( 261      ( 03 Dec 2018 05:57 )             35.5     12-03    138  |  102  |  12  ----------------------------<  97  3.7   |  21<L>  |  0.61    Ca    8.5      03 Dec 2018 05:57  Phos  3.8     12-03  Mg     2.2     12-03        Cultures  Culture Results:   No growth to date. (12-01 @ 14:36)  Culture Results:   No growth to date. (12-01 @ 14:36)      A/P: 27yo F PMH Anxiety, Depression, PTSD, ADHD, comes to [facility] after suicide attempt with an overdose of benzodiazepines. Incidental SBO (resolved) and urinary retention x3 in the last two days. Wong last placed at 2am.   -Pain control with Tylenol and lidocaine jelly   -Encourage upright position and ambulation   -Repeat UA   -TOV 6am tomorrow morning 12/4

## 2018-12-03 NOTE — PROGRESS NOTE BEHAVIORAL HEALTH - SUMMARY
Ms. Aguilar is a 28 year old female, , domiciled alone, [employer] student, single without children, with a medical history of obesity and a psychiatric history of Depression, Anxiety, ADHD, and PTSD, 3 remote psych hosps in 2008 for SI, 2 remote suicide attempts via overdose, cutting, without violence/aggression, substance use, who was BIBEMS on 12/1/18 found down x 2 days after the patient intentionally overdosed on alcohol and Xanax.  Psychiatry was called for an urgent consult as the patient reported active SI in am. Patient is currently on 1:1 observation and has a Burnette secondary to the urinary retention.   Patient currently denies any active SI and agrees with psychiatric admission. As per patient;s mother she is considering to be transferred to another hospital as she is not satisfied with her current care,.   Plan:  -give lorazepam 1mg x1 Ms. Aguilar is a 28 year old female, , domiciled alone, [employer] student, single without children, with a medical history of obesity and a psychiatric history of Depression, Anxiety, ADHD, and PTSD, 3 remote psych hosps in 2008 for SI, 2 remote suicide attempts via overdose, cutting, without violence/aggression, substance use, who was BIBEMS on 12/1/18 found down x 2 days after the patient intentionally overdosed on alcohol and Xanax.  Psychiatry was called for an urgent consult as the patient reported active SI in am. Patient is currently on 1:1 observation and has a Burnette secondary to the urinary retention.   Patient currently denies any active SI and agrees with psychiatric admission. As per patient's mother she is considering to be transferred to another hospital as she is not satisfied with her current care,.   Plan:  -give lorazepam 1mg x1  - Ms. Aguilar is a 28 year old female, , domiciled alone, [employer] student, single without children, with a medical history of obesity and a psychiatric history of Depression, Anxiety, ADHD, and PTSD, 3 remote psych hosps in 2008 for SI, 2 remote suicide attempts via overdose, cutting, without violence/aggression, substance use, who was BIBEMS on 12/1/18 found down x 2 days after the patient intentionally overdosed on alcohol and Xanax.  Psychiatry was called for an urgent consult as the patient reported active SI in am. Patient is currently on 1:1 observation and has a Burnette secondary to the urinary retention.   Patient currently denies any active SI and agrees with psychiatric admission. As per patient's mother she is considering to be transferred to another hospital as she is not satisfied with her current care,.   Plan:  -give lorazepam 1mg x1  -can restart sertraline 50mg po daily  -patient requires inpatient psychiatric treatment when medically cleared  -continue 1:1 observation Ms. Aguilar is a 28 year old female, , domiciled alone, [employer] student, single without children, with a medical history of obesity and a psychiatric history of Depression, Anxiety, ADHD, and PTSD, 3 remote psych hosps in 2008 for SI, 2 remote suicide attempts via overdose, cutting, without violence/aggression, substance use, who was BIBEMS on 12/1/18 found down x 2 days after the patient intentionally overdosed on alcohol and Xanax.  Psychiatry was called for an urgent consult as the patient reported active SI in am. Patient is currently on 1:1 observation and has a Burnette secondary to the urinary retention.   Patient currently denies any active SI and agrees with psychiatric admission. As per patient's mother she is considering to be transferred to another hospital as she is not satisfied with her current care,.   Plan:  -start lorazepam 0.5mg po q12h prn anxiety  -can restart sertraline 50mg po daily for treatment of depression and anxiety,   -continue to hold daytrana and doxepine  -patient requires inpatient psychiatric treatment when medically cleared  -continue 1:1 observation

## 2018-12-03 NOTE — PROGRESS NOTE BEHAVIORAL HEALTH - NSBHCONSULTMEDS_PSY_A_CORE FT
-Would initiate Sertraline 100 mg po daily, first dose 12/3, to avoid withdrawal  -Hold Doxazosin at this time  -Hold Daytrana at this time -can restart sertraline 50mg po daily   -Hold Doxazosin at this time  -Hold Daytrana at this time

## 2018-12-03 NOTE — PROGRESS NOTE ADULT - SUBJECTIVE AND OBJECTIVE BOX
SUBJECTIVE: Pt seen and examined at bedside. Pt was visibly upset when team entered the room, actively intensely crying, stating she felt scared and that she wanted to die. She was pleading for the team to "kill her now". Pt too upset to answer any further questions from the team. When asked what she was scared of, she stated she repeated that she did not "know the team" and "just wanted to die". Burnette currently in place and pt making appropriate urine output.     heparin  Injectable 7500 Unit(s) SubCutaneous every 8 hours      Vital Signs Last 24 Hrs  T(C): 36.7 (03 Dec 2018 06:12), Max: 37 (02 Dec 2018 09:05)  T(F): 98.1 (03 Dec 2018 06:12), Max: 98.6 (02 Dec 2018 09:05)  HR: 62 (03 Dec 2018 06:12) (62 - 98)  BP: 123/70 (03 Dec 2018 06:12) (114/68 - 134/73)  BP(mean): --  RR: 16 (03 Dec 2018 06:12) (16 - 18)  SpO2: 95% (03 Dec 2018 06:12) (95% - 98%)    General: NAD, crying, visibly upset  Pulm: Nonlabored breathing, no respiratory distress  Abd: soft, NT/ND.  Unable to complete exam due to emotional distress of the patient      LABS:                        11.4   9.2   )-----------( 261      ( 03 Dec 2018 05:57 )             35.5     12-03    138  |  102  |  12  ----------------------------<  97  3.7   |  21<L>  |  0.61    Ca    8.5      03 Dec 2018 05:57  Phos  3.8     12-03  Mg     2.2     12-03    TPro  7.9  /  Alb  2.6<L>  /  TBili  Footnote  /  DBili  x   /  AST  71<H>  /  ALT  Footnote  /  AlkPhos  97  12-01    PT/INR - ( 01 Dec 2018 10:15 )   PT: 13.1 sec;   INR: 1.18          PTT - ( 01 Dec 2018 10:15 )  PTT:32.6 sec

## 2018-12-03 NOTE — PROGRESS NOTE ADULT - ASSESSMENT
A/P: 28F s/p failed suicide attempt found to have dilated small bowel on CT, with bowel function, unlikely SBO, pt is actively suicidal this morning.     Call psychiatry for urgent psychiatric management of active suicidality  Transfer to regional  pain/nausea control  IS/OOB  NPO, IVF  SCDs, HSQ  consults: psych

## 2018-12-03 NOTE — PROGRESS NOTE BEHAVIORAL HEALTH - NSBHFUPINTERVALHXFT_PSY_A_CORE
The writer was paged for an urgent evaluation of the patient as she was noticed to be more agitated and expressed active SI.   Patient was interviewed in her room. Patient was on 1:1 observation and her mother was at bedside.   Upon interview, patient reports that she feels better. She reports that in the morning she felt confused, in pain (secondary to the Burnette) and anxious. Patient admits that at the time she felt unsafe, however she denies any active SI at this time. She reports ambivalence about surviving the SA, but states that she feels tremendous guilt towards her parents and does not want to hurt herself again. She would like to be restarted on her psychiatric medications and agrees with psychiatric admission.   She talks about the severity of her suicide attempt (planned for the last 2 weeks, took 6-8 tabs of xanax and had an alcohol enema). The writer was paged for an urgent evaluation of the patient as she was noticed to be more agitated and expressed active SI.   Patient was interviewed in her room. Patient was on 1:1 observation and her mother was at bedside.   Upon interview, patient reports that she feels better. She reports that in the morning she felt confused, in pain (secondary to the Burnette) and anxious. Patient admits that at the time she felt unsafe, however she denies any active SI at this time. She reports ambivalence about surviving the SA, but states that she feels tremendous guilt towards her parents and does not want to hurt herself again. She would like to be restarted on her psychiatric medications and agrees with psychiatric admission.   She talks about the severity of her suicide attempt (planned for the last 2 weeks, took 6-8 tabs of xanax and had an alcohol enema). Prior to her admission, patient was taking zoloft, xanax.  she denies abusing substances. She reports that zoloft was helpful with her symptoms.  The writer spoke with patient's mother who reported that they would prefer for the patient to be transferred to another hospital (patient's mother is currently looking for different options). They will let know the writer. The writer was paged for an urgent evaluation of the patient as she was noticed to be more agitated and expressed active SI.   Patient was interviewed in her room. Patient was on 1:1 observation and her mother was at bedside.   Upon interview, patient reports that she feels better. She reports that in the morning she felt confused, in pain (secondary to the Burnette) and anxious. Patient admits that at the time she felt unsafe, however she denies any active SI at this time. She reports ambivalence about surviving the SA, but states that she feels tremendous guilt towards her parents and does not want to hurt herself again. She would like to be restarted on her psychiatric medications and agrees with psychiatric admission.   She talks about the severity of her suicide attempt (planned for the last 2 weeks, took 6-8 tabs of xanax and had an alcohol enema). Prior to her admission, patient was taking zoloft, xanax.  she denies abusing substances. She reports that zoloft was helpful with her symptoms.  The writer spoke with patient's mother who reported that they would prefer for the patient to be transferred to another hospital (patient's mother is currently looking for different options). They will let know the writer. At this time patient is still not able to void urine and requires a Burnette.

## 2018-12-04 LAB
ANION GAP SERPL CALC-SCNC: 12 MMOL/L — SIGNIFICANT CHANGE UP (ref 5–17)
BUN SERPL-MCNC: 11 MG/DL — SIGNIFICANT CHANGE UP (ref 7–23)
CALCIUM SERPL-MCNC: 8.7 MG/DL — SIGNIFICANT CHANGE UP (ref 8.4–10.5)
CHLORIDE SERPL-SCNC: 108 MMOL/L — SIGNIFICANT CHANGE UP (ref 96–108)
CO2 SERPL-SCNC: 21 MMOL/L — LOW (ref 22–31)
CREAT SERPL-MCNC: 0.63 MG/DL — SIGNIFICANT CHANGE UP (ref 0.5–1.3)
GLUCOSE SERPL-MCNC: 85 MG/DL — SIGNIFICANT CHANGE UP (ref 70–99)
HADV DNA FLD NAA+PROBE-LOG#: NEGATIVE — SIGNIFICANT CHANGE UP
HCT VFR BLD CALC: 36.2 % — SIGNIFICANT CHANGE UP (ref 34.5–45)
HGB BLD-MCNC: 12.1 G/DL — SIGNIFICANT CHANGE UP (ref 11.5–15.5)
MAGNESIUM SERPL-MCNC: 2 MG/DL — SIGNIFICANT CHANGE UP (ref 1.6–2.6)
MCHC RBC-ENTMCNC: 27 PG — SIGNIFICANT CHANGE UP (ref 27–34)
MCHC RBC-ENTMCNC: 33.4 G/DL — SIGNIFICANT CHANGE UP (ref 32–36)
MCV RBC AUTO: 80.8 FL — SIGNIFICANT CHANGE UP (ref 80–100)
PHOSPHATE SERPL-MCNC: 5.2 MG/DL — HIGH (ref 2.5–4.5)
PLATELET # BLD AUTO: 320 K/UL — SIGNIFICANT CHANGE UP (ref 150–400)
POTASSIUM SERPL-MCNC: 3.9 MMOL/L — SIGNIFICANT CHANGE UP (ref 3.5–5.3)
POTASSIUM SERPL-SCNC: 3.9 MMOL/L — SIGNIFICANT CHANGE UP (ref 3.5–5.3)
RBC # BLD: 4.48 M/UL — SIGNIFICANT CHANGE UP (ref 3.8–5.2)
RBC # FLD: 14.4 % — SIGNIFICANT CHANGE UP (ref 10.3–16.9)
SODIUM SERPL-SCNC: 141 MMOL/L — SIGNIFICANT CHANGE UP (ref 135–145)
WBC # BLD: 9.9 K/UL — SIGNIFICANT CHANGE UP (ref 3.8–10.5)
WBC # FLD AUTO: 9.9 K/UL — SIGNIFICANT CHANGE UP (ref 3.8–10.5)

## 2018-12-04 PROCEDURE — 99233 SBSQ HOSP IP/OBS HIGH 50: CPT

## 2018-12-04 RX ORDER — PHENAZOPYRIDINE HCL 100 MG
100 TABLET ORAL EVERY 8 HOURS
Qty: 0 | Refills: 0 | Status: DISCONTINUED | OUTPATIENT
Start: 2018-12-04 | End: 2018-12-05

## 2018-12-04 RX ORDER — SERTRALINE 25 MG/1
50 TABLET, FILM COATED ORAL DAILY
Qty: 0 | Refills: 0 | Status: DISCONTINUED | OUTPATIENT
Start: 2018-12-04 | End: 2018-12-05

## 2018-12-04 RX ORDER — POTASSIUM CHLORIDE 20 MEQ
20 PACKET (EA) ORAL ONCE
Qty: 0 | Refills: 0 | Status: COMPLETED | OUTPATIENT
Start: 2018-12-04 | End: 2018-12-04

## 2018-12-04 RX ADMIN — HEPARIN SODIUM 7500 UNIT(S): 5000 INJECTION INTRAVENOUS; SUBCUTANEOUS at 13:59

## 2018-12-04 RX ADMIN — Medication 100 MILLIGRAM(S): at 14:00

## 2018-12-04 RX ADMIN — Medication 100 MILLIGRAM(S): at 22:22

## 2018-12-04 RX ADMIN — Medication 650 MILLIGRAM(S): at 19:30

## 2018-12-04 RX ADMIN — Medication 0.5 MILLIGRAM(S): at 18:44

## 2018-12-04 RX ADMIN — Medication 1000 MILLIGRAM(S): at 00:40

## 2018-12-04 RX ADMIN — Medication 100 MILLIGRAM(S): at 07:06

## 2018-12-04 RX ADMIN — HEPARIN SODIUM 7500 UNIT(S): 5000 INJECTION INTRAVENOUS; SUBCUTANEOUS at 07:04

## 2018-12-04 RX ADMIN — Medication 20 MILLIEQUIVALENT(S): at 10:12

## 2018-12-04 RX ADMIN — HEPARIN SODIUM 7500 UNIT(S): 5000 INJECTION INTRAVENOUS; SUBCUTANEOUS at 22:22

## 2018-12-04 RX ADMIN — Medication 650 MILLIGRAM(S): at 18:44

## 2018-12-04 NOTE — PROGRESS NOTE BEHAVIORAL HEALTH - NSBHCONSULTMEDS_PSY_A_CORE FT
-can restart sertraline 50mg po daily   -Hold Doxazosin at this time  -Hold Daytrana at this time -d/c sertraline  -Hold Doxazosin at this time  -Hold Daytrana at this time

## 2018-12-04 NOTE — PROGRESS NOTE ADULT - SUBJECTIVE AND OBJECTIVE BOX
SUBJECTIVE: Pt seen and examined at bedside, affect much improved since yesterday, no suicidal ideation. Pain is well controlled. Denies chest pain, SOB. Denies nausea, emesis. Making appropriate UO via wong catheter. States she has mild leg shaking which she attributes to her anxiety as it worsens when she feels more anxious. Wong removed on rounds this am      heparin  Injectable 7500 Unit(s) SubCutaneous every 8 hours      Vital Signs Last 24 Hrs  T(C): 36.8 (04 Dec 2018 05:36), Max: 37 (04 Dec 2018 02:03)  T(F): 98.3 (04 Dec 2018 05:36), Max: 98.6 (04 Dec 2018 02:03)  HR: 68 (04 Dec 2018 05:36) (68 - 81)  BP: 122/662 (04 Dec 2018 05:36) (106/68 - 132/70)  BP(mean): --  RR: 18 (04 Dec 2018 05:36) (16 - 18)  SpO2: 98% (04 Dec 2018 05:36) (95% - 99%)    General: NAD, resting comfortably in bed  Pulm: Nonlabored breathing, no respiratory distress  Abd: soft, NT/ND. wong in place  Extrem: WWP, no edema,       LABS:                        12.1   9.9   )-----------( 320      ( 04 Dec 2018 06:36 )             36.2     12-03    138  |  102  |  12  ----------------------------<  97  3.7   |  21<L>  |  0.61    Ca    8.5      03 Dec 2018 05:57  Phos  3.8     12-03  Mg     2.2     12-03

## 2018-12-04 NOTE — PROGRESS NOTE BEHAVIORAL HEALTH - NSBHFUPINTERVALHXFT_PSY_A_CORE
Patient seen and interviewed in her room. Patient's mother and father were present. She reports that her mood has improved. This morning she had an episode of emotional distress and anxiety but was able to use her DBT skills to cope with it. She decided that she wants to be transferred to [facility] where she was se Patient seen and interviewed in her room. Patient's mother and father were present. She reports that her mood has improved. This morning she had an episode of emotional distress and anxiety but was able to use her DBT skills to cope with it. Patient reports today that her mood has improved. She denies any Si. She reports motivation to start her psychiatric treatment. She prefers to be transferred to [employer] for an inpatient admission as she is familiar with the system. Patient prefers to defer starting a psychotropic medication until after she is transferred (refused to take zoloft this am).

## 2018-12-04 NOTE — PROGRESS NOTE ADULT - ASSESSMENT
A/P: 28F s/p failed suicide attempt found to have dilated small bowel on CT, with bowel function, unlikely SBO, affect much improved this morning.       pain/nausea control  Burnette removed on am rounds, TOV 2:30 pm  IS/OOB  Regular diet  SCDs, HSQ  consults: psych 28F s/p failed suicide attempt found to have dilated loops, unlikely SBO given return of bowel function now w/ urinary retention likely 2/2 benzo overdose. Patient to be transferred to inpatient psych once urinary retention resolves.    pain/nausea control  IS/OOB  Regular diet  SCDs, HSQ  consults: psych  dc DANIEL wong

## 2018-12-04 NOTE — PROGRESS NOTE BEHAVIORAL HEALTH - SUMMARY
Ms. Aguilar is a 28 year old female, , domiciled alone, [employer] student, single without children, with a medical history of obesity and a psychiatric history of Depression, Anxiety, ADHD, and PTSD, 3 remote psych hosps in 2008 for SI, 2 remote suicide attempts via overdose, cutting, without violence/aggression, substance use, who was BIBEMS on 12/1/18 found down x 2 days after the patient intentionally overdosed on alcohol and Xanax.  Psychiatry was called for an urgent consult as the patient reported active SI in am. Patient is currently on 1:1 observation and has a Burnette secondary to the urinary retention.   Patient currently denies any active SI and agrees with psychiatric admission. As per patient's mother she is considering to be transferred to another hospital as she is not satisfied with her current care,.   Plan:  -start lorazepam 0.5mg po q12h prn anxiety  -can restart sertraline 50mg po daily for treatment of depression and anxiety,   -continue to hold daytrana and doxepine  -patient requires inpatient psychiatric treatment when medically cleared  -continue 1:1 observation Ms. Aguilar is a 28 year old female, , domiciled alone, [employer] student, single without children, with a medical history of obesity and a psychiatric history of Depression, Anxiety, ADHD, and PTSD, 3 remote psych hosps in 2008 for SI, 2 remote suicide attempts via overdose, cutting, without violence/aggression, substance use, who was BIBEMS on 12/1/18 found down x 2 days after the patient intentionally overdosed on alcohol and Xanax. Patient currently reports improvement in her mood, denies any SI and feels motivated to start inpatient psychiatric treatment. Patient prefers to be transferred to [employer].  Patient was accepted for transfer pending medical clearance.   Patient signed documentation for a voluntary psychiatric admission.  Plan:  -continue lorazepam 0.5mg po q12h prn anxiety  -d/c sertraline, patient prefers to wait until her admission to inpatient psychiatry to restart taking psychotropics   -continue to hold daytrana and doxepine  -patient to be transferred to [facility] when medically cleared  -continue 1:1 observation

## 2018-12-05 VITALS
HEART RATE: 87 BPM | SYSTOLIC BLOOD PRESSURE: 114 MMHG | OXYGEN SATURATION: 100 % | RESPIRATION RATE: 16 BRPM | DIASTOLIC BLOOD PRESSURE: 73 MMHG

## 2018-12-05 PROCEDURE — 80053 COMPREHEN METABOLIC PANEL: CPT

## 2018-12-05 PROCEDURE — 87040 BLOOD CULTURE FOR BACTERIA: CPT

## 2018-12-05 PROCEDURE — 85610 PROTHROMBIN TIME: CPT

## 2018-12-05 PROCEDURE — 96375 TX/PRO/DX INJ NEW DRUG ADDON: CPT

## 2018-12-05 PROCEDURE — 71250 CT THORAX DX C-: CPT

## 2018-12-05 PROCEDURE — 84702 CHORIONIC GONADOTROPIN TEST: CPT

## 2018-12-05 PROCEDURE — 87633 RESP VIRUS 12-25 TARGETS: CPT

## 2018-12-05 PROCEDURE — 72131 CT LUMBAR SPINE W/O DYE: CPT

## 2018-12-05 PROCEDURE — 83735 ASSAY OF MAGNESIUM: CPT

## 2018-12-05 PROCEDURE — 70450 CT HEAD/BRAIN W/O DYE: CPT

## 2018-12-05 PROCEDURE — 93005 ELECTROCARDIOGRAM TRACING: CPT

## 2018-12-05 PROCEDURE — 72125 CT NECK SPINE W/O DYE: CPT

## 2018-12-05 PROCEDURE — 85730 THROMBOPLASTIN TIME PARTIAL: CPT

## 2018-12-05 PROCEDURE — 83605 ASSAY OF LACTIC ACID: CPT

## 2018-12-05 PROCEDURE — 36415 COLL VENOUS BLD VENIPUNCTURE: CPT

## 2018-12-05 PROCEDURE — 85025 COMPLETE CBC W/AUTO DIFF WBC: CPT

## 2018-12-05 PROCEDURE — 80048 BASIC METABOLIC PNL TOTAL CA: CPT

## 2018-12-05 PROCEDURE — 82962 GLUCOSE BLOOD TEST: CPT

## 2018-12-05 PROCEDURE — 84100 ASSAY OF PHOSPHORUS: CPT

## 2018-12-05 PROCEDURE — 96366 THER/PROPH/DIAG IV INF ADDON: CPT

## 2018-12-05 PROCEDURE — 96365 THER/PROPH/DIAG IV INF INIT: CPT

## 2018-12-05 PROCEDURE — 82550 ASSAY OF CK (CPK): CPT

## 2018-12-05 PROCEDURE — 80307 DRUG TEST PRSMV CHEM ANLYZR: CPT

## 2018-12-05 PROCEDURE — 87581 M.PNEUMON DNA AMP PROBE: CPT

## 2018-12-05 PROCEDURE — 72192 CT PELVIS W/O DYE: CPT

## 2018-12-05 PROCEDURE — 85027 COMPLETE CBC AUTOMATED: CPT

## 2018-12-05 PROCEDURE — 71045 X-RAY EXAM CHEST 1 VIEW: CPT

## 2018-12-05 PROCEDURE — 99285 EMERGENCY DEPT VISIT HI MDM: CPT | Mod: 25

## 2018-12-05 PROCEDURE — 96361 HYDRATE IV INFUSION ADD-ON: CPT

## 2018-12-05 PROCEDURE — 74176 CT ABD & PELVIS W/O CONTRAST: CPT

## 2018-12-05 PROCEDURE — 96376 TX/PRO/DX INJ SAME DRUG ADON: CPT

## 2018-12-05 PROCEDURE — 99233 SBSQ HOSP IP/OBS HIGH 50: CPT

## 2018-12-05 PROCEDURE — 81001 URINALYSIS AUTO W/SCOPE: CPT

## 2018-12-05 PROCEDURE — 87798 DETECT AGENT NOS DNA AMP: CPT

## 2018-12-05 PROCEDURE — 87486 CHLMYD PNEUM DNA AMP PROBE: CPT

## 2018-12-05 RX ADMIN — Medication 650 MILLIGRAM(S): at 01:20

## 2018-12-05 RX ADMIN — Medication 100 MILLIGRAM(S): at 14:39

## 2018-12-05 RX ADMIN — HEPARIN SODIUM 7500 UNIT(S): 5000 INJECTION INTRAVENOUS; SUBCUTANEOUS at 05:31

## 2018-12-05 RX ADMIN — HEPARIN SODIUM 7500 UNIT(S): 5000 INJECTION INTRAVENOUS; SUBCUTANEOUS at 14:49

## 2018-12-05 RX ADMIN — Medication 650 MILLIGRAM(S): at 00:26

## 2018-12-05 RX ADMIN — Medication 100 MILLIGRAM(S): at 05:31

## 2018-12-05 NOTE — DISCHARGE NOTE ADULT - PLAN OF CARE
Recovery -Please continue to eat your regular diet as tolerated and drink plenty of fluids.  -For pain, you may take tylenol/NSAIDs (such as motrin/advil) as labeled, as needed.  -You may restart home meds as deemed appropriate by [facility] staff.  -You may contact Dr. Covington with any further questions.

## 2018-12-05 NOTE — PROGRESS NOTE BEHAVIORAL HEALTH - NSBHCHARTREVIEWLAB_PSY_A_CORE FT
CBC Full  -  ( 04 Dec 2018 06:36 )  WBC Count : 9.9 K/uL  Hemoglobin : 12.1 g/dL  Hematocrit : 36.2 %  Platelet Count - Automated : 320 K/uL  Mean Cell Volume : 80.8 fL  Mean Cell Hemoglobin : 27.0 pg  Mean Cell Hemoglobin Concentration : 33.4 g/dL  Auto Neutrophil # : x  Auto Lymphocyte # : x  Auto Monocyte # : x  Auto Eosinophil # : x  Auto Basophil # : x  Auto Neutrophil % : x  Auto Lymphocyte % : x  Auto Monocyte % : x  Auto Eosinophil % : x  Auto Basophil % : x  12-04    141  |  108  |  11  ----------------------------<  85  3.9   |  21<L>  |  0.63    Ca    8.7      04 Dec 2018 06:36  Phos  5.2     12-04  Mg     2.0     12-04
CBC Full  -  ( 04 Dec 2018 06:36 )  WBC Count : 9.9 K/uL  Hemoglobin : 12.1 g/dL  Hematocrit : 36.2 %  Platelet Count - Automated : 320 K/uL  Mean Cell Volume : 80.8 fL  Mean Cell Hemoglobin : 27.0 pg  Mean Cell Hemoglobin Concentration : 33.4 g/dL  Auto Neutrophil # : x  Auto Lymphocyte # : x  Auto Monocyte # : x  Auto Eosinophil # : x  Auto Basophil # : x  Auto Neutrophil % : x  Auto Lymphocyte % : x  Auto Monocyte % : x  Auto Eosinophil % : x  Auto Basophil % : x  12-04    141  |  108  |  11  ----------------------------<  85  3.9   |  21<L>  |  0.63    Ca    8.7      04 Dec 2018 06:36  Phos  5.2     12-04  Mg     2.0     12-04

## 2018-12-05 NOTE — PROGRESS NOTE ADULT - ASSESSMENT
28F s/p failed suicide attempt found to have dilated loops, unlikely SBO given return of bowel function now w/ urinary retention likely 2/2 benzo overdose. Patient to be transferred to inpatient psych once urinary retention resolves.    pain/nausea control  IS/OOB  Regular diet  SCDs, HSQ  Awaiting xfer to [facility] inpatient psychiatry

## 2018-12-05 NOTE — DISCHARGE NOTE ADULT - PATIENT PORTAL LINK FT
You can access the [facility] Portal, offered by [facility], by registering with the following website: [email or web address]

## 2018-12-05 NOTE — PROGRESS NOTE BEHAVIORAL HEALTH - SUMMARY
Ms. Aguilar is a 28 year old female, , domiciled alone, [employer] student, single without children, with a medical history of obesity and a psychiatric history of Depression, Anxiety, ADHD, and PTSD, 3 remote psych hosps in 2008 for SI, 2 remote suicide attempts via overdose, cutting, without violence/aggression, substance use, who was BIBEMS on 12/1/18 found down x 2 days after the patient intentionally overdosed on alcohol and Xanax. Patient currently reports improvement in her mood, denies any SI and feels motivated to start inpatient psychiatric treatment. Patient prefers to be transferred to [facility].  Patient was accepted for transfer pending medical clearance.   Patient signed documentation for a voluntary psychiatric admission.  Plan:  -continue lorazepam 0.5mg po q12h prn anxiety  -if the patient is not transferred today, can start melatonin 3mg po qhs for insomnia and benadryl 50mg po qhs prn insomnia  -d/c sertraline, patient prefers to wait until her admission to inpatient psychiatry to restart taking psychotropics   -continue to hold daytrana and doxepine  -patient to be transferred to [facility] when medically cleared  -continue 1:1 observation

## 2018-12-05 NOTE — DISCHARGE NOTE ADULT - CARE PROVIDER_API CALL
Asaf Covington), Surgery  [address] [place] [address]  [address]  [place] [address]  Phone: [phone]  Fax: [phone]

## 2018-12-05 NOTE — DISCHARGE NOTE ADULT - HOSPITAL COURSE
28F PSHx lap band was found down by EMS in her apartment after drinking alcohol and taking approximately 10 xanax in a suicide attempt. Patient was down for 2 days with intermittent consciousness. Patient complained of bilateral hip, buttock, and suprapubic pain. At [facility], patient found to be febrile to 102F. Patient underwent CT scan which revealed SBO at proximal ileum likely 2/2 adhesions vs abuse of benzodiazepines. NT tube was placed which patient took out herself however shortly after arrival symptoms of obstruction began to resolve and patient began passing flatus. A wong was placed as patient was unable to urinate and she proceeded to fail 2 void trials. Upon removal of her wong catheter on 12/4 patient passed her trial of void and was therefore deemed medically cleared for transfer to [facility].

## 2018-12-05 NOTE — PROGRESS NOTE BEHAVIORAL HEALTH - NSBHCHARTREVIEWIMAGING_PSY_A_CORE FT
< from: Xray Chest 1 View AP/PA (12.01.18 @ 16:57) >    EXAM:  XR CHEST AP OR PA 1V                           PROCEDURE DATE:  12/01/2018          INTERPRETATION:  Clinical History: NG tube    Portable examination chest demonstrates nasoenteric tube overlying course   of esophagus and left upper abdomen. The heart is within normal limits in   transverse diameter. Patient's head obscures the lung apices. Visualized   osseous structures are within normal limits    Impression: No acute infiltrates              INTERPRETATION:  Adam JJ MD, have reviewed the images   and agree with the report.    INDICATIONS: Fall.    TECHNIQUE:  Serial axial images were obtained from the skull base to the   vertex without the use of intravenous contrast. Coronal and sagittal   reconstructions were created and reviewed.    COMPARISON EXAMINATION: None.    FINDINGS:      VENTRICLES AND SULCI: Within normal limits. No evidence of hydrocephalus.  INTRA-AXIAL: No intracranial mass, acute hemorrhage, or midline shift is   present. Gray-white differentiation is preserved.  EXTRA-AXIAL: No extra-axial fluid collection is present.   VISUALIZED SINUSES: No air-fluid levels are identified.   VISUALIZED MASTOIDS:  Clear.  CALVARIUM:  No fracture.  MISCELLANEOUS:  None.    IMPRESSION:      < end of copied text >
< from: Xray Chest 1 View AP/PA (12.01.18 @ 16:57) >    EXAM:  XR CHEST AP OR PA 1V                           PROCEDURE DATE:  12/01/2018          INTERPRETATION:  Clinical History: NG tube    Portable examination chest demonstrates nasoenteric tube overlying course   of esophagus and left upper abdomen. The heart is within normal limits in   transverse diameter. Patient's head obscures the lung apices. Visualized   osseous structures are within normal limits    Impression: No acute infiltrates              INTERPRETATION:  Adam JJ MD, have reviewed the images   and agree with the report.    INDICATIONS: Fall.    TECHNIQUE:  Serial axial images were obtained from the skull base to the   vertex without the use of intravenous contrast. Coronal and sagittal   reconstructions were created and reviewed.    COMPARISON EXAMINATION: None.    FINDINGS:      VENTRICLES AND SULCI: Within normal limits. No evidence of hydrocephalus.  INTRA-AXIAL: No intracranial mass, acute hemorrhage, or midline shift is   present. Gray-white differentiation is preserved.  EXTRA-AXIAL: No extra-axial fluid collection is present.   VISUALIZED SINUSES: No air-fluid levels are identified.   VISUALIZED MASTOIDS:  Clear.  CALVARIUM:  No fracture.  MISCELLANEOUS:  None.    IMPRESSION:      < end of copied text >

## 2018-12-05 NOTE — PROGRESS NOTE BEHAVIORAL HEALTH - NSBHCONSULTFOLLOWDETAILS_PSY_A_CORE FT
To be transferred to inpatient psychiatry when medically cleared.

## 2018-12-05 NOTE — PROGRESS NOTE BEHAVIORAL HEALTH - NSBHFUPINTERVALCCFT_PSY_A_CORE
Ms. Aguilar is a 28 year old female, , domiciled alone, [employer] student, single without children, with a medical history of obesity and a psychiatric history of Depression, Anxiety, ADHD, and PTSD, 3 remote psych hosps in 2008 for SI, 2 remote suicide attempts via overdose, cutting, without violence/aggression, substance use, who was BIBEMS on 12/1/18 found down x 2 days after the patient intentionally overdosed on alcohol and Xanax. The patient said she had researched how to administer and alcohol enema and attempted to insert one liter of alcohol into her rectum. She also planned to take 60 tablets of 1 mg Xanax but stated 'I messed up the order" and she passed out before she could finish the pills. She reported she was unable to get up and laid on the floor for two days, yelling for help. She had been planning this attempt for weeks after she "gave it a good ten years." She reported she tried to be happy and dhruv rid of her depression for 10 years, since her last attempt, and told herself she would try again if she did not make progress. She reported she has suicidal ideation on more days than she does not. She did not feel safe returning home due to ongoing suicidal thoughts without plan or intent. She reported feeling disappointed and guilty. She was agreeable to being voluntarily admitted. Denied Si, HI, AVH. Patient was admitted to a medical floor for urinary retention and r/o SBO. Patient requested at transfer to [facility] for inpatient psychiatric admission. Patient was accepted at [facility] and she is currently waiting to be medically cleared.
depression, suicidal ideation
Ms. Aguilar is a 28 year old female, , domiciled alone, [employer] student, single without children, with a medical history of obesity and a psychiatric history of Depression, Anxiety, ADHD, and PTSD, 3 remote psych hosps in 2008 for SI, 2 remote suicide attempts via overdose, cutting, without violence/aggression, substance use, who was BIBEMS on 12/1/18 found down x 2 days after the patient intentionally overdosed on alcohol and Xanax. The patient said she had researched how to administer and alcohol enema and attempted to insert one liter of alcohol into her rectum. She also planned to take 60 tablets of 1 mg Xanax but stated 'I messed up the order" and she passed out before she could finish the pills. She reported she was unable to get up and laid on the floor for two days, yelling for help. She had been planning this attempt for weeks after she "gave it a good ten years." She reported she tried to be happy and dhruv rid of her depression for 10 years, since her last attempt, and told herself she would try again if she did not make progress. She reported she has suicidal ideation on more days than she does not. She did not feel safe returning home due to ongoing suicidal thoughts without plan or intent. She reported feeling disappointed and guilty. She was agreeable to being voluntarily admitted. Denied Si, HI, AVH. Patient was admitted to a medical floor for urinary retention and r/o SBO.

## 2018-12-05 NOTE — PROGRESS NOTE BEHAVIORAL HEALTH - NSBHFUPINTERVALHXFT_PSY_A_CORE
Patient seen and interviewed this am in her room. Patient reports insomnia and requests a sleeping aid (melatonin vs benadryl). Her mood has been up and down. She has been feeling anxious about her parents reading her suicide note. 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 She talks about her struggles with her self worth and maintaining a stable sense of self. She reviews with the writer different DBT skills that she has been using since she came to the hospital. She denies any SI.  The writer address patient's history of substance use and Utox positive for opiates. Patient reports past history of alcohol and MJ abuse. She denies any recent substance abuse issues. She repots that her Utox was likely positive due to taking a tablet of oxycodone during the SA )had in her medicine cabinet since she had surgery).

## 2018-12-05 NOTE — PROGRESS NOTE BEHAVIORAL HEALTH - NSBHCHARTREVIEWVS_PSY_A_CORE FT
Vital Signs Last 24 Hrs  T(C): 37.1 (05 Dec 2018 10:22), Max: 37.1 (04 Dec 2018 16:30)  T(F): 98.7 (05 Dec 2018 10:22), Max: 98.7 (04 Dec 2018 16:30)  HR: 75 (05 Dec 2018 10:22) (60 - 78)  BP: 121/77 (05 Dec 2018 10:22) (93/55 - 131/75)  BP(mean): --  RR: 16 (05 Dec 2018 10:22) (16 - 18)  SpO2: 98% (05 Dec 2018 10:22) (98% - 100%)
ICU Vital Signs Last 24 Hrs  T(C): 37.1 (04 Dec 2018 08:24), Max: 37.1 (04 Dec 2018 08:24)  T(F): 98.8 (04 Dec 2018 08:24), Max: 98.8 (04 Dec 2018 08:24)  HR: 82 (04 Dec 2018 08:24) (68 - 82)  BP: 128/63 (04 Dec 2018 08:24) (106/68 - 128/63)  BP(mean): --  ABP: --  ABP(mean): --  RR: 17 (04 Dec 2018 08:24) (16 - 18)  SpO2: 98% (04 Dec 2018 08:24) (98% - 99%)

## 2018-12-05 NOTE — PROGRESS NOTE BEHAVIORAL HEALTH - NSBHADMITCOUNSEL_PSY_A_CORE
diagnostic results/impressions and/or recommended studies/risks and benefits of treatment options/risk factor reduction/importance of adherence to chosen treatment
risks and benefits of treatment options/risk factor reduction/importance of adherence to chosen treatment/diagnostic results/impressions and/or recommended studies
risks and benefits of treatment options/risk factor reduction/diagnostic results/impressions and/or recommended studies/importance of adherence to chosen treatment

## 2018-12-05 NOTE — PROGRESS NOTE ADULT - SUBJECTIVE AND OBJECTIVE BOX
heparin  Injectable 7500 Unit(s) SubCutaneous every 8 hours      Vital Signs Last 24 Hrs  T(C): 36.8 (05 Dec 2018 05:33), Max: 37.1 (04 Dec 2018 16:30)  T(F): 98.3 (05 Dec 2018 05:33), Max: 98.7 (04 Dec 2018 16:30)  HR: 68 (05 Dec 2018 05:33) (60 - 78)  BP: 131/75 (05 Dec 2018 05:33) (93/55 - 131/75)  BP(mean): --  RR: 16 (05 Dec 2018 05:33) (16 - 18)  SpO2: 98% (05 Dec 2018 05:33) (98% - 100%)  I&O's Detail    04 Dec 2018 07:01  -  05 Dec 2018 07:00  --------------------------------------------------------  IN:    Oral Fluid: 440 mL  Total IN: 440 mL    OUT:    Voided: 1900 mL  Total OUT: 1900 mL    Total NET: -1460 mL        LABS:                        12.1   9.9   )-----------( 320      ( 04 Dec 2018 06:36 )             36.2     12-04    141  |  108  |  11  ----------------------------<  85  3.9   |  21<L>  |  0.63    Ca    8.7      04 Dec 2018 06:36  Phos  5.2     12-04  Mg     2.0     12-04        Urinalysis Basic - ( 03 Dec 2018 12:27 )    Color: Yellow / Appearance: Clear / S.010 / pH: x  Gluc: x / Ketone: Trace mg/dL  / Bili: Negative / Urobili: 0.2 E.U./dL   Blood: x / Protein: NEGATIVE mg/dL / Nitrite: NEGATIVE   Leuk Esterase: NEGATIVE / RBC: < 5 /HPF / WBC < 5 /HPF   Sq Epi: x / Non Sq Epi: 5-10 /HPF / Bacteria: Present /HPF

## 2018-12-05 NOTE — DISCHARGE NOTE ADULT - CARE PLAN
Principal Discharge DX:	Small bowel obstruction due to adhesions  Goal:	Recovery  Assessment and plan of treatment:	-Please continue to eat your regular diet as tolerated and drink plenty of fluids.  -For pain, you may take tylenol/NSAIDs (such as motrin/advil) as labeled, as needed.  -You may restart home meds as deemed appropriate by [facility] staff.  -You may contact Dr. Covington with any further questions.

## 2018-12-05 NOTE — PROGRESS NOTE BEHAVIORAL HEALTH - SECONDARY DX1
Personality disorder, unspecified

## 2018-12-05 NOTE — PROGRESS NOTE BEHAVIORAL HEALTH - RISK ASSESSMENT
Patient is high risk of repeat self harm given ongoing suicidal ideation

## 2018-12-06 LAB
CULTURE RESULTS: SIGNIFICANT CHANGE UP
CULTURE RESULTS: SIGNIFICANT CHANGE UP
SPECIMEN SOURCE: SIGNIFICANT CHANGE UP
SPECIMEN SOURCE: SIGNIFICANT CHANGE UP

## 2018-12-07 DIAGNOSIS — F60.9 PERSONALITY DISORDER, UNSPECIFIED: ICD-10-CM

## 2018-12-07 DIAGNOSIS — R45.851 SUICIDAL IDEATIONS: ICD-10-CM

## 2018-12-07 DIAGNOSIS — Z98.84 BARIATRIC SURGERY STATUS: ICD-10-CM

## 2018-12-07 DIAGNOSIS — K56.609 UNSPECIFIED INTESTINAL OBSTRUCTION, UNSPECIFIED AS TO PARTIAL VERSUS COMPLETE OBSTRUCTION: ICD-10-CM

## 2018-12-07 DIAGNOSIS — R33.8 OTHER RETENTION OF URINE: ICD-10-CM

## 2018-12-07 DIAGNOSIS — G47.00 INSOMNIA, UNSPECIFIED: ICD-10-CM

## 2018-12-07 DIAGNOSIS — F43.10 POST-TRAUMATIC STRESS DISORDER, UNSPECIFIED: ICD-10-CM

## 2018-12-07 DIAGNOSIS — T42.4X2A POISONING BY BENZODIAZEPINES, INTENTIONAL SELF-HARM, INITIAL ENCOUNTER: ICD-10-CM

## 2018-12-07 DIAGNOSIS — Z91.013 ALLERGY TO SEAFOOD: ICD-10-CM

## 2018-12-07 DIAGNOSIS — F90.9 ATTENTION-DEFICIT HYPERACTIVITY DISORDER, UNSPECIFIED TYPE: ICD-10-CM

## 2018-12-07 DIAGNOSIS — Z88.1 ALLERGY STATUS TO OTHER ANTIBIOTIC AGENTS STATUS: ICD-10-CM

## 2018-12-07 DIAGNOSIS — F41.9 ANXIETY DISORDER, UNSPECIFIED: ICD-10-CM

## 2018-12-07 DIAGNOSIS — Z91.5 PERSONAL HISTORY OF SELF-HARM: ICD-10-CM

## 2018-12-07 DIAGNOSIS — E66.9 OBESITY, UNSPECIFIED: ICD-10-CM
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
## 2018-12-07 DIAGNOSIS — F33.2 MAJOR DEPRESSIVE DISORDER, RECURRENT SEVERE WITHOUT PSYCHOTIC FEATURES: ICD-10-CM

## 2018-12-07 DIAGNOSIS — E86.0 DEHYDRATION: ICD-10-CM

## 2018-12-18 DIAGNOSIS — K56.50 INTESTINAL ADHESIONS [BANDS], UNSPECIFIED AS TO PARTIAL VERSUS COMPLETE OBSTRUCTION: ICD-10-CM

## 2020-03-27 NOTE — DISCHARGE NOTE ADULT - PRINCIPAL DIAGNOSIS
This is a recent snapshot of the patient's [facility] medical record.  For current drug dose and complete information and questions, call [phone] or In Basket pool, [facility] home infusion ([id number])  CSN Number:  [id number]       Small bowel obstruction due to adhesions

## 2020-11-12 NOTE — ED ADULT NURSE NOTE - ED STAT RN HANDOFF TIME
-- DO NOT REPLY / DO NOT REPLY ALL --  -- Message is from the [facility]--    COVID-19 Universal Screening: N/A - Not about scheduling    General Patient Message      Reason for Call: Ms. Jenkins lives at an [facility] facility in [place] called [facility].  There was a resident that may have been exposed to COVID and is self quarantining.  Patient would like to speak with Dr. Nguyen for advise and questions about how to proceed.    Caller Information       Type Contact Phone    11/12/2020 02:01 PM CST Phone (Incoming) Janie Lainez (Self) [phone] (H)          Alternative phone number: none    Turnaround time given to caller:   \"This message will be sent to [state Provider's name]. The clinical team will fulfill your request as soon as they review your message.\"     17:53

## 2023-05-02 NOTE — PROGRESS NOTE BEHAVIORAL HEALTH - NSBHFUPSUICACTIVE_PSY_A_CORE
CC:  Linwood To is here today for elevated labs and schedule colonoscopy .    Medications: medications verified, no change  Refills needed today?  NO  denies known Latex allergy or symptoms of Latex sensitivity.  Patient would like communication of their results via:      Cell Phone:   Telephone Information:   Mobile [phone]     Okay to leave a message containing results? Yes   none known

## 2023-07-31 NOTE — BEHAVIORAL HEALTH ASSESSMENT NOTE - CURRENT PLAN
Add 99765 Cpt? (Important Note: In 2017 The Use Of 11064 Is Being Tracked By Cms To Determine Future Global Period Reimbursement For Global Periods): no None known Detail Level: Detailed